# Patient Record
Sex: FEMALE | Race: WHITE
[De-identification: names, ages, dates, MRNs, and addresses within clinical notes are randomized per-mention and may not be internally consistent; named-entity substitution may affect disease eponyms.]

---

## 2018-07-18 ENCOUNTER — HOSPITAL ENCOUNTER (EMERGENCY)
Dept: HOSPITAL 80 - FED | Age: 28
Discharge: PSYCHIATRIC OR SUBSTANCE USE FACILITY | End: 2018-07-18
Payer: COMMERCIAL

## 2018-07-18 VITALS — SYSTOLIC BLOOD PRESSURE: 113 MMHG | DIASTOLIC BLOOD PRESSURE: 71 MMHG

## 2018-07-18 DIAGNOSIS — F22: Primary | ICD-10-CM

## 2018-07-18 LAB — PLATELET # BLD: 271 10^3/UL (ref 150–400)

## 2018-07-18 PROCEDURE — G0480 DRUG TEST DEF 1-7 CLASSES: HCPCS

## 2018-07-18 NOTE — ASMTLCPROG
Notes

 

Note:                         

Notes:

[facility] communicated with [facility].  Since pt. was evaluated in the [facility] also completed 

placement. Pt. was transferred to [facility] under the care of Dr. Myrna Hou.

 

Date Signed:  07/18/2018 11:25 PM

Electronically Signed By:Alanis Paulino
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
## 2018-07-18 NOTE — ASMTTCLDSP
[facility] Discharge Disposition

 

Disposition:                  Answers:  Transfer                              

Disposition Notes:            

Notes:

[facility] COMPLETED PLACEMENT.  PT WAS ACCEPTED AT [facility] UNDER THE CARE OF DR. DELMI TAPIA.

Type of Hold:                 Answers:  M1/72-hour Hold                       

Hold initiated by:            Answers:  Other                         Notes:  [facility] EVALAUTOR

For Transfers, Accepting      [facility]

Facility:                     

For Transfers, Accepting      [facility]

Psychiatrist:                 

For Transfers, Reason         NO BEDS ON 3N

Patient is Being              

Transferred:                  

 

Date Signed:  07/18/2018 11:28 PM

Electronically Signed By:Alanis Paulino